# Patient Record
Sex: FEMALE | Race: WHITE | Employment: PART TIME | URBAN - METROPOLITAN AREA
[De-identification: names, ages, dates, MRNs, and addresses within clinical notes are randomized per-mention and may not be internally consistent; named-entity substitution may affect disease eponyms.]

---

## 2018-02-09 ENCOUNTER — HOSPITAL ENCOUNTER (OUTPATIENT)
Dept: LAB | Age: 43
Discharge: HOME | End: 2018-02-09
Payer: COMMERCIAL

## 2018-02-09 LAB
CRP SERPL-MCNC: <0.3 MG/DL (ref 0–0.9)
ERYTHROCYTE [SEDIMENTATION RATE] IN BLOOD: 16 MM/HR (ref 0–20)
FIBRINOGEN PPP-MCNC: 353 MG/DL (ref 190–501)
URATE SERPL-MCNC: 2.2 MG/DL (ref 2.6–6)

## 2018-02-09 PROCEDURE — 85652 RBC SED RATE AUTOMATED: CPT

## 2018-02-09 PROCEDURE — 84550 ASSAY OF BLOOD/URIC ACID: CPT

## 2018-02-09 PROCEDURE — 86140 C-REACTIVE PROTEIN: CPT

## 2018-02-09 PROCEDURE — 36415 COLL VENOUS BLD VENIPUNCTURE: CPT

## 2018-02-09 PROCEDURE — 86060 ANTISTREPTOLYSIN O TITER: CPT

## 2018-02-09 PROCEDURE — 85384 FIBRINOGEN ACTIVITY: CPT

## 2018-02-09 PROCEDURE — 82652 VIT D 1 25-DIHYDROXY: CPT

## 2018-02-09 PROCEDURE — 85810 BLOOD VISCOSITY EXAMINATION: CPT

## 2018-02-09 PROCEDURE — 82306 VITAMIN D 25 HYDROXY: CPT

## 2018-02-09 PROCEDURE — 86430 RHEUMATOID FACTOR TEST QUAL: CPT

## 2018-02-09 PROCEDURE — 86038 ANTINUCLEAR ANTIBODIES: CPT

## 2018-02-10 LAB
25(OH)D3+25(OH)D2 SERPL-MCNC: 58 NG/ML (ref 30–100)
ANA SER QL: NEGATIVE
ASO AB SERPL-ACNC: 59 IU/ML (ref 0–200)

## 2018-02-12 LAB
1,25(OH)2D3 SERPL-MCNC: 56.6 PG/ML (ref 19.9–79.3)
25(OH)D2 SERPL-MCNC: <1 NG/ML
25(OH)D3 SERPL-MCNC: 48 NG/ML
25(OH)D3+25(OH)D2 SERPL-MCNC: 48 NG/ML
RHEUMATOID FACT SER QL LA: NEGATIVE
VISC SER: 1.6 REL.SALINE (ref 1.6–1.9)

## 2018-04-09 ENCOUNTER — HOSPITAL ENCOUNTER (OUTPATIENT)
Dept: LAB | Age: 43
Discharge: HOME | End: 2018-04-09
Payer: COMMERCIAL

## 2018-04-09 PROCEDURE — 82306 VITAMIN D 25 HYDROXY: CPT

## 2018-04-09 PROCEDURE — 36415 COLL VENOUS BLD VENIPUNCTURE: CPT

## 2018-04-09 PROCEDURE — 86617 LYME DISEASE ANTIBODY: CPT

## 2018-04-09 PROCEDURE — 82652 VIT D 1 25-DIHYDROXY: CPT

## 2018-04-10 LAB
1,25(OH)2D3 SERPL-MCNC: 67.5 PG/ML (ref 19.9–79.3)
25(OH)D3+25(OH)D2 SERPL-MCNC: 65 NG/ML (ref 30–100)

## 2018-04-12 LAB
25(OH)D2 SERPL-MCNC: <1 NG/ML
25(OH)D3 SERPL-MCNC: 70 NG/ML
25(OH)D3+25(OH)D2 SERPL-MCNC: 70 NG/ML
B BURGDOR IGG PATRN SER IB-IMP: NEGATIVE
B BURGDOR IGM PATRN SER IB-IMP: NEGATIVE
B BURGDOR18KD IGG SER QL IB: ABNORMAL
B BURGDOR23KD IGG SER QL IB: ABNORMAL
B BURGDOR23KD IGM SER QL IB: PRESENT
B BURGDOR28KD IGG SER QL IB: ABNORMAL
B BURGDOR30KD IGG SER QL IB: ABNORMAL
B BURGDOR39KD IGG SER QL IB: ABNORMAL
B BURGDOR39KD IGM SER QL IB: ABNORMAL
B BURGDOR41KD IGG SER QL IB: PRESENT
B BURGDOR41KD IGM SER QL IB: ABNORMAL
B BURGDOR45KD IGG SER QL IB: ABNORMAL
B BURGDOR58KD IGG SER QL IB: ABNORMAL
B BURGDOR66KD IGG SER QL IB: ABNORMAL
B BURGDOR93KD IGG SER QL IB: ABNORMAL

## 2019-02-18 ENCOUNTER — APPOINTMENT (RX ONLY)
Dept: URBAN - METROPOLITAN AREA CLINIC 349 | Facility: CLINIC | Age: 44
Setting detail: DERMATOLOGY
End: 2019-02-18

## 2019-02-18 DIAGNOSIS — L71.8 OTHER ROSACEA: ICD-10-CM

## 2019-02-18 DIAGNOSIS — Z71.89 OTHER SPECIFIED COUNSELING: ICD-10-CM

## 2019-02-18 DIAGNOSIS — D22 MELANOCYTIC NEVI: ICD-10-CM

## 2019-02-18 DIAGNOSIS — L21.8 OTHER SEBORRHEIC DERMATITIS: ICD-10-CM

## 2019-02-18 DIAGNOSIS — I78.8 OTHER DISEASES OF CAPILLARIES: ICD-10-CM

## 2019-02-18 DIAGNOSIS — L82.1 OTHER SEBORRHEIC KERATOSIS: ICD-10-CM

## 2019-02-18 PROBLEM — D22.5 MELANOCYTIC NEVI OF TRUNK: Status: ACTIVE | Noted: 2019-02-18

## 2019-02-18 PROBLEM — L70.0 ACNE VULGARIS: Status: ACTIVE | Noted: 2019-02-18

## 2019-02-18 PROBLEM — D48.5 NEOPLASM OF UNCERTAIN BEHAVIOR OF SKIN: Status: ACTIVE | Noted: 2019-02-18

## 2019-02-18 PROBLEM — D22.72 MELANOCYTIC NEVI OF LEFT LOWER LIMB, INCLUDING HIP: Status: ACTIVE | Noted: 2019-02-18

## 2019-02-18 PROBLEM — L29.8 OTHER PRURITUS: Status: ACTIVE | Noted: 2019-02-18

## 2019-02-18 PROCEDURE — ? COUNSELING

## 2019-02-18 PROCEDURE — ? EDUCATIONAL RESOURCES PROVIDED

## 2019-02-18 PROCEDURE — 99202 OFFICE O/P NEW SF 15 MIN: CPT

## 2019-02-18 PROCEDURE — ? OTHER

## 2019-02-18 PROCEDURE — ? OBSERVATION

## 2019-02-18 ASSESSMENT — LOCATION DETAILED DESCRIPTION DERM
LOCATION DETAILED: LEFT LATERAL MALAR CHEEK
LOCATION DETAILED: LEFT ANTERIOR DISTAL THIGH
LOCATION DETAILED: RIGHT CENTRAL MALAR CHEEK
LOCATION DETAILED: LEFT SUPERIOR OCCIPITAL SCALP
LOCATION DETAILED: RIGHT SUPERIOR OCCIPITAL SCALP
LOCATION DETAILED: LEFT CENTRAL MALAR CHEEK
LOCATION DETAILED: RIGHT INFERIOR CENTRAL MALAR CHEEK
LOCATION DETAILED: MID-OCCIPITAL SCALP
LOCATION DETAILED: NASAL DORSUM
LOCATION DETAILED: RIGHT SUPERIOR UPPER BACK
LOCATION DETAILED: RIGHT MEDIAL BREAST 12-1:00 REGION

## 2019-02-18 ASSESSMENT — LOCATION SIMPLE DESCRIPTION DERM
LOCATION SIMPLE: RIGHT CHEEK
LOCATION SIMPLE: RIGHT BREAST
LOCATION SIMPLE: LEFT THIGH
LOCATION SIMPLE: POSTERIOR SCALP
LOCATION SIMPLE: LEFT CHEEK
LOCATION SIMPLE: NOSE
LOCATION SIMPLE: RIGHT UPPER BACK

## 2019-02-18 ASSESSMENT — LOCATION ZONE DERM
LOCATION ZONE: NOSE
LOCATION ZONE: TRUNK
LOCATION ZONE: FACE
LOCATION ZONE: LEG
LOCATION ZONE: SCALP

## 2019-02-18 NOTE — PROCEDURE: OTHER
Other (Free Text): Continue regimen prescribed by PCP
Detail Level: Detailed
Note Text (......Xxx Chief Complaint.): This diagnosis correlates with the
Other (Free Text): Pt stated she had both lesions treated with a couple different lasers and they never resolve. There didn’t appear to have any scale to the lesion. No evidence of AK or malignancy or dermoscopy.

## 2020-07-25 ENCOUNTER — TELEPHONE ENCOUNTER (OUTPATIENT)
Dept: URBAN - METROPOLITAN AREA CLINIC 13 | Facility: CLINIC | Age: 45
End: 2020-07-25

## 2020-07-26 ENCOUNTER — TELEPHONE ENCOUNTER (OUTPATIENT)
Dept: URBAN - METROPOLITAN AREA CLINIC 13 | Facility: CLINIC | Age: 45
End: 2020-07-26